# Patient Record
Sex: MALE | Race: BLACK OR AFRICAN AMERICAN
[De-identification: names, ages, dates, MRNs, and addresses within clinical notes are randomized per-mention and may not be internally consistent; named-entity substitution may affect disease eponyms.]

---

## 2018-09-03 ENCOUNTER — HOSPITAL ENCOUNTER (EMERGENCY)
Dept: HOSPITAL 62 - ER | Age: 48
Discharge: HOME | End: 2018-09-03
Payer: MEDICARE

## 2018-09-03 VITALS — SYSTOLIC BLOOD PRESSURE: 177 MMHG | DIASTOLIC BLOOD PRESSURE: 90 MMHG

## 2018-09-03 DIAGNOSIS — E11.40: ICD-10-CM
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
## 2018-09-03 DIAGNOSIS — Z89.422: ICD-10-CM

## 2018-09-03 DIAGNOSIS — J45.909: ICD-10-CM

## 2018-09-03 DIAGNOSIS — I10: ICD-10-CM

## 2018-09-03 DIAGNOSIS — Z89.412: ICD-10-CM

## 2018-09-03 DIAGNOSIS — S90.821A: Primary | ICD-10-CM

## 2018-09-03 PROCEDURE — 99283 EMERGENCY DEPT VISIT LOW MDM: CPT

## 2018-09-03 NOTE — ER DOCUMENT REPORT
ED Medical Screen (RME)





- General


Chief Complaint: Foot Injury


Stated Complaint: CUT ON LEFT FOOT


Time Seen by Provider: 09/03/18 13:10


Mode of Arrival: Ambulatory


Information source: Patient


Notes: 





48-year-old man with a history of diabetes, hypertension, stroke who presents 

with blister to the bottom of his right foot.  Patient is visiting from 

[place] and was walking on the beach yesterday and noticed a blister this 

morning.  Did not go in the water and he was wearing socks the whole time.  He 

has not had any pain.  He does have some amount of neuropathy but has sensation 

to the foot.  He has had amputations of the first 2 toes of his left foot.  He 

denies any fever or pus drainage.  He has cleaned the wound with soap and water

, peroxide and is placed bacitracin over the wound.


TRAVEL OUTSIDE OF THE [place] IN LAST 30 DAYS: No





- HPI


Onset: Just prior to arrival


Onset/Duration: Gradual


Quality of pain: No pain


Associated Symptoms: None, Other - No pain, no redness, no discharge..  denies: 

Fever


Exacerbated by: Denies


Relieved by: Denies


Similar symptoms previously: Yes


Recently seen / treated by doctor: No





- Related Data


Smoking: Non-smoker


Frequency of alcohol use: None


Drug Abuse: None


Allergies/Adverse Reactions: 


 





Sulfa (Sulfonamide Antibiotics) Adverse Reaction (Severe, Verified 09/03/18 12:

16)


 











Past Medical History





- General


Information source: Patient





- Social History


Cigarette use (# per day): No


Chew tobacco use (# tins/day): No


Frequency of alcohol use: None


Drug Abuse: None


Lives with: Family


Family history: None





- Past Medical History


Cardiac Medical History: Reports: Hx Hypertension


Pulmonary Medical History: Reports: Hx Asthma


Endocrine Medical History: Reports: Hx Diabetes Mellitus Type 2


Renal/ Medical History: Reports: Hx End Stage Renal Disease, Hx Peritoneal 

Dialysis


Past Surgical History: Reports: Hx Orthopedic Surgery





Review of Systems





- Review of Systems


Constitutional: denies: Chills, Fever


EENT: No symptoms reported


Cardiovascular: denies: Chest pain, Palpitations, Heart racing


Respiratory: No symptoms reported


Gastrointestinal: No symptoms reported


Genitourinary: No symptoms reported


Musculoskeletal: See HPI


Skin: See HPI


Hematologic/Lymphatic: No symptoms reported


Neurological/Psychological: No symptoms reported





Physical Exam





- Vital signs


Vitals: 


 











Temp Pulse Resp BP Pulse Ox


 


 98.2 F   92   18   177/90 H  100 


 


 09/03/18 12:20  09/03/18 12:20  09/03/18 12:20  09/03/18 12:20  09/03/18 12:20











Notes: 





Physical exam:


 


GENERAL: 48-year-old man, alert and oriented 3, no acute distress.  He appears 

well.





HEAD: Atraumatic, normocephalic.





EYES: Pupils equal round and reactive to light, extraocular movements intact, 

sclera anicteric, conjunctiva are normal.





ENT: Moist mucous membranes.





NECK: Normal range of motion, supple without obvious mass





LUNGS: Breath sounds clear to auscultation bilaterally and equal.  No wheezes 

rales or rhonchi.





HEART: Regular rate and rhythm without murmurs, rubs or gallops.





ABDOMEN: Soft, normoactive bowel sounds.  No tenderness to palpation.  No 

guarding, no rebound.  No masses appreciated.





EXTREMITIES: Left foot: Patient has had amputations of the first 2 toes.  He 

does have a blister which is popped over the right MTP joint.  There is no 

erythema, warmth or discharge.  The tissue appears viable and clean.  There is 

no evidence of infection.  There is no tenderness.  There is no evidence of 

foreign body.  The foot is well-perfused.





NEUROLOGICAL: Cranial nerves II through XII grossly intact.  





PSYCH: Normal mood, normal affect.





SKIN: Warm, Dry, normal turgor, no rashes or lesions noted.





Course





- Re-evaluation


Re-evalutation: 





09/03/18 13:24


Note: I had a long conversation with the patient.  He did start taking 

amoxicillin (which he did have on hand) because he is concerned about getting 

infection.  He does have renal issues.  He does have an allergy to sulfa.  I 

will place him on Keflex to continue a course of antibiotics which has a little 

bit better staph coverage.  However, I do not see any evidence of infection at 

this time.  Given the patient's significant history, he is at risk of infection 

and we talked about this.  He does plan on going back to [place] tomorrow and 

will be following up with his doctors as soon as he gets back in town.





- Vital Signs


Vital signs: 


 











Temp Pulse Resp BP Pulse Ox


 


 98.2 F   92   18   177/90 H  100 


 


 09/03/18 12:20  09/03/18 12:20  09/03/18 12:20  09/03/18 12:20  09/03/18 12:20














Doctor's Discharge





- Discharge


Clinical Impression: 


 wound right foot





Condition: Stable


Disposition: HOME, SELF-CARE


Additional Instructions: 


As we discussed, I would stop taking the amoxicillin.  You could take the 

Keflex as prescribed.


The wound looks quite good today: Relieving some of the excess tissue for 

coverage.


There is no evidence of infection at this time.  Given that you have started 

the amoxicillin, we will use Keflex instead for the next few days.


Keep the wound clean.  Continue using bacitracin on the outside of the wound.


When not walking around, keep the wound open.


Follow-up with your primary care doctors when back in [place].


Go to the nearest ER if you develop any concerns for infection: Redness, pus 

drainage, warmth, fever (temperature greater than 100.5).





Prescriptions: 


Cephalexin Monohydrate [Keflex 500 mg Capsule] 500 mg PO Q6H 5 Days  capsule


Referrals: 


JERALD BERNAL MD [ACTIVE STAFF] - Follow up as needed (This is the number 

of a good internal medicine Dr. affiliated with this [facility].)


SANTOS FERREIRA MD [ACTIVE STAFF] - Follow up as needed (This is the number 

of a kidney doctor affiliated with this [facility].)